# Patient Record
Sex: MALE | Race: WHITE | NOT HISPANIC OR LATINO | URBAN - METROPOLITAN AREA
[De-identification: names, ages, dates, MRNs, and addresses within clinical notes are randomized per-mention and may not be internally consistent; named-entity substitution may affect disease eponyms.]

---

## 2020-02-24 ENCOUNTER — OFFICE VISIT (OUTPATIENT)
Dept: URBAN - METROPOLITAN AREA CLINIC 32 | Facility: CLINIC | Age: 70
End: 2020-02-24

## 2020-02-24 DIAGNOSIS — H43.11 VITREOUS HEMORRHAGE, RIGHT EYE: Primary | ICD-10-CM

## 2020-02-24 PROCEDURE — 99204 OFFICE O/P NEW MOD 45 MIN: CPT | Performed by: OPTOMETRIST

## 2020-02-24 PROCEDURE — 92134 CPTRZ OPH DX IMG PST SGM RTA: CPT | Performed by: OPTOMETRIST

## 2020-02-24 ASSESSMENT — INTRAOCULAR PRESSURE
OS: 11
OD: 10

## 2020-02-27 ENCOUNTER — OFFICE VISIT (OUTPATIENT)
Dept: URBAN - METROPOLITAN AREA CLINIC 23 | Facility: CLINIC | Age: 70
End: 2020-02-27

## 2020-02-27 DIAGNOSIS — H35.372 PUCKERING OF MACULA, LEFT EYE: ICD-10-CM

## 2020-02-27 DIAGNOSIS — H33.312 HORSESHOE TEAR OF RETINA WITHOUT DETACHMENT, LEFT EYE: ICD-10-CM

## 2020-02-27 PROCEDURE — 92004 COMPRE OPH EXAM NEW PT 1/>: CPT | Performed by: OPHTHALMOLOGY

## 2020-02-27 PROCEDURE — 92134 CPTRZ OPH DX IMG PST SGM RTA: CPT | Performed by: OPHTHALMOLOGY

## 2020-02-27 ASSESSMENT — INTRAOCULAR PRESSURE
OS: 13
OD: 13

## 2020-02-27 NOTE — IMPRESSION/PLAN
Impression: Vitreous hemorrhage, right eye: H43.11. OD. Condition: established, stable. Vision: vision affected. Plan: Discussed diagnosis in detail with patient. Exam OD shows vitreous hemorrhage. GONIO exam OD shows no retinal tears or detachments. Discussed with patient that no definite tears or detachments were seen upon examination today, but there is a lot of blood in the eye, and it is possible that a tear could be underneath the hemorrhage. Recommend a return visit in a short period of time to allow for the blood to clear and re-examine the eye. Pt instructed to call if vision changes.   OCT OD mild ERM

## 2020-02-27 NOTE — IMPRESSION/PLAN
Impression: Puckering of macula, left eye: H35.372. OS. Condition: established, stable. Vision: vision not affected. Plan: Discussed diagnosis in detail with patient. No treatment is required at this time. Will continue to observe condition and or symptoms. Discussed signs and symptoms of PVD/floaters. Discussed signs and symptoms of retinal detachment. Call if [address] worsens.   OCT OS shows mild ERM

## 2020-02-27 NOTE — IMPRESSION/PLAN
Impression: s/p PCA laser for [place]orseshoe tear of retina without detachment, left eye: H33.312. OS. Condition: stable post laser. s/p PCA laser OS 2/5/2016 Plan: Discussed diagnosis in detail with patient. Exam OS shows tears well surrounded by laser inferior and superior. No new RD/RT found. No treatment is required at this time. Will continue to observe condition and or symptoms. Discussed signs and symptoms of retinal detachment. Discussed signs and symptoms of PVD/floaters. Call if [place] worsens.

## 2020-03-03 ENCOUNTER — SURGERY (OUTPATIENT)
Dept: URBAN - METROPOLITAN AREA SURGERY 11 | Facility: SURGERY | Age: 70
End: 2020-03-03

## 2020-03-03 ENCOUNTER — OFFICE VISIT (OUTPATIENT)
Dept: URBAN - METROPOLITAN AREA CLINIC 23 | Facility: CLINIC | Age: 70
End: 2020-03-03

## 2020-03-03 DIAGNOSIS — H33.311 HORSESHOE TEAR OF RETINA WITHOUT DETACHMENT, RIGHT EYE: Primary | ICD-10-CM

## 2020-03-03 PROCEDURE — 67145 PROPH RTA DTCHMNT PC: CPT | Performed by: OPHTHALMOLOGY

## 2020-03-03 PROCEDURE — 92014 COMPRE OPH EXAM EST PT 1/>: CPT | Performed by: OPHTHALMOLOGY

## 2020-03-03 PROCEDURE — 92134 CPTRZ OPH DX IMG PST SGM RTA: CPT | Performed by: OPHTHALMOLOGY

## 2020-03-03 ASSESSMENT — INTRAOCULAR PRESSURE
OD: 14
OD: 14
OS: 14
OS: 14

## 2020-03-03 NOTE — IMPRESSION/PLAN
Impression: Vitreous hemorrhage, right eye: H43.11 OD. Condition: unstable. Vision: vision affected. Plan: Advised patient of condition. Discussed diagnosis in detail with patient.  see notes above

## 2020-03-11 ENCOUNTER — POST-OPERATIVE VISIT (OUTPATIENT)
Dept: URBAN - METROPOLITAN AREA CLINIC 23 | Facility: CLINIC | Age: 70
End: 2020-03-11

## 2020-03-11 DIAGNOSIS — Z09 ENCNTR FOR F/U EXAM AFT TRTMT FOR COND OTH THAN MALIG NEOPLM: Primary | ICD-10-CM

## 2020-03-11 ASSESSMENT — INTRAOCULAR PRESSURE
OD: 10
OS: 16